# Patient Record
Sex: FEMALE | Race: WHITE | NOT HISPANIC OR LATINO | Employment: UNEMPLOYED | URBAN - METROPOLITAN AREA
[De-identification: names, ages, dates, MRNs, and addresses within clinical notes are randomized per-mention and may not be internally consistent; named-entity substitution may affect disease eponyms.]

---

## 2019-10-23 ENCOUNTER — PREP FOR SURGERY (OUTPATIENT)
Dept: OTHER | Facility: HOSPITAL | Age: 59
End: 2019-10-23

## 2019-10-23 ENCOUNTER — OFFICE VISIT (OUTPATIENT)
Dept: SURGERY | Facility: CLINIC | Age: 59
End: 2019-10-23

## 2019-10-23 VITALS
DIASTOLIC BLOOD PRESSURE: 88 MMHG | SYSTOLIC BLOOD PRESSURE: 128 MMHG | HEART RATE: 66 BPM | HEIGHT: 64 IN | BODY MASS INDEX: 33.26 KG/M2 | WEIGHT: 194.8 LBS | OXYGEN SATURATION: 98 % | TEMPERATURE: 98 F

## 2019-10-23 DIAGNOSIS — K21.9 GASTROESOPHAGEAL REFLUX DISEASE, ESOPHAGITIS PRESENCE NOT SPECIFIED: Primary | ICD-10-CM

## 2019-10-23 PROCEDURE — 99203 OFFICE O/P NEW LOW 30 MIN: CPT | Performed by: SURGERY

## 2019-10-23 RX ORDER — FLUOXETINE HYDROCHLORIDE 20 MG/1
20 CAPSULE ORAL NIGHTLY
COMMUNITY
Start: 2019-09-24

## 2019-10-23 RX ORDER — LEVOTHYROXINE SODIUM 50 UG/1
50 CAPSULE ORAL DAILY
COMMUNITY
Start: 2019-10-04

## 2019-10-23 NOTE — H&P
Subjective   Christelle Odell is a 58 y.o. female.     History of present illness  Christelle is seen in the office today at the request of her primary care physician for severe reflux disease.  She has had reflux for about 20 years.  Is been on all the medications.  She is concerned and would like to be able to stop her medicines.  Her last scope was several years ago.  In the office today we gone over the anatomy behind reflux.  We discussed hiatal hernias.  We discussed medications and infection those.  She is interested in a transoral fundoplication.  She been doing some reading about that.  I explained that we need to do an EGD to make sure she is a candidate first.  We would take a peek things and dilate her esophagus to make sure that we can pass the instrument.  We will then be back in the office to discuss her findings and decide if she is a candidate for a different whether she needs something more invasive.    Past Medical History:   Diagnosis Date   • Arthritis    • Depression    • Hypothyroidism        No past surgical history on file.    Outpatient Encounter Medications as of 10/23/2019   Medication Sig Dispense Refill   • Fexofenadine HCl (ALLEGRA ALLERGY PO) Take  by mouth.     • FLUoxetine (PROzac) 20 MG capsule Take 20 mg by mouth Daily.     • Multiple Vitamins-Minerals (MULTIVITAL PO) Take  by mouth.     • Nutritional Supplements (VITAMIN D BOOSTER PO) Take  by mouth.     • TIROSINT 50 MCG capsule Take 50 mcg by mouth Daily.       No facility-administered encounter medications on file as of 10/23/2019.        No Known Allergies    Family History   Problem Relation Age of Onset   • Hypertension Mother    • Depression Mother    • Arthritis Mother    • Allergies Mother    • Hypertension Father    • Allergies Father        Social History     Socioeconomic History   • Marital status:      Spouse name: Not on file   • Number of children: Not on file   • Years of education: Not on file   • Highest  education level: Not on file   Tobacco Use   • Smoking status: Never Smoker   • Smokeless tobacco: Never Used   Substance and Sexual Activity   • Alcohol use: Yes   • Drug use: No   • Sexual activity: Defer       The following portions of the patient's history were reviewed and updated as appropriate: allergies, current medications, past family history, past medical history, past social history, past surgical history and problem list.    Objective       Assessment/Plan   There are no diagnoses linked to this encounter.    Pleat review of systems is done and unremarkable with exception of the chief complaint.    Physical exam shows pleasant 58-year-old female.  HEENT is negative.  Heart is regular.  Lungs clear.  Abdomen soft nontender.  Extremities show equal range of motion of lower extremities previous medical strength and usage.  Neuro shows no obvious focal deficit.    Impression: 58-year-old with severe GERD    Recommendation: EGD with dilatation           Jose Antonio Ramirez DO  10/23/2019  3:14 PM

## 2019-10-23 NOTE — PROGRESS NOTES
Subjective   Christelle Odell is a 58 y.o. female.     History of present illness  Christelle is seen in the office today at the request of her primary care physician for severe reflux disease.  She has had reflux for about 20 years.  Is been on all the medications.  She is concerned and would like to be able to stop her medicines.  Her last scope was several years ago.  In the office today we gone over the anatomy behind reflux.  We discussed hiatal hernias.  We discussed medications and infection those.  She is interested in a transoral fundoplication.  She been doing some reading about that.  I explained that we need to do an EGD to make sure she is a candidate first.  We would take a peek things and dilate her esophagus to make sure that we can pass the instrument.  We will then be back in the office to discuss her findings and decide if she is a candidate for a different whether she needs something more invasive.    Past Medical History:   Diagnosis Date   • Arthritis    • Depression    • Hypothyroidism        History reviewed. No pertinent surgical history.    Outpatient Encounter Medications as of 10/23/2019   Medication Sig Dispense Refill   • Fexofenadine HCl (ALLEGRA ALLERGY PO) Take  by mouth.     • FLUoxetine (PROzac) 20 MG capsule Take 20 mg by mouth Daily.     • Multiple Vitamins-Minerals (MULTIVITAL PO) Take  by mouth.     • Nutritional Supplements (VITAMIN D BOOSTER PO) Take  by mouth.     • TIROSINT 50 MCG capsule Take 50 mcg by mouth Daily.       No facility-administered encounter medications on file as of 10/23/2019.        No Known Allergies    Family History   Problem Relation Age of Onset   • Hypertension Mother    • Depression Mother    • Arthritis Mother    • Allergies Mother    • Hypertension Father    • Allergies Father        Social History     Socioeconomic History   • Marital status:      Spouse name: Not on file   • Number of children: Not on file   • Years of education: Not on file   •  Highest education level: Not on file   Tobacco Use   • Smoking status: Never Smoker   • Smokeless tobacco: Never Used   Substance and Sexual Activity   • Alcohol use: Yes   • Drug use: No   • Sexual activity: Defer       The following portions of the patient's history were reviewed and updated as appropriate: allergies, current medications, past family history, past medical history, past social history, past surgical history and problem list.    Objective       Assessment/Plan   There are no diagnoses linked to this encounter.    Pleat review of systems is done and unremarkable with exception of the chief complaint.    Physical exam shows pleasant 58-year-old female.  HEENT is negative.  Heart is regular.  Lungs clear.  Abdomen soft nontender.  Extremities show equal range of motion of lower extremities previous medical strength and usage.  Neuro shows no obvious focal deficit.    Impression: 58-year-old with severe GERD    Recommendation: EGD with dilatation           Jose Antonio Ramirez DO  10/23/2019  3:10 PM

## 2019-11-04 ENCOUNTER — ANESTHESIA EVENT (OUTPATIENT)
Dept: GASTROENTEROLOGY | Facility: HOSPITAL | Age: 59
End: 2019-11-04

## 2019-11-05 ENCOUNTER — ANESTHESIA (OUTPATIENT)
Dept: GASTROENTEROLOGY | Facility: HOSPITAL | Age: 59
End: 2019-11-05

## 2019-11-05 ENCOUNTER — HOSPITAL ENCOUNTER (OUTPATIENT)
Facility: HOSPITAL | Age: 59
Setting detail: HOSPITAL OUTPATIENT SURGERY
Discharge: HOME | End: 2019-11-05
Attending: SURGERY | Admitting: SURGERY

## 2019-11-05 VITALS
HEIGHT: 63 IN | DIASTOLIC BLOOD PRESSURE: 85 MMHG | OXYGEN SATURATION: 98 % | SYSTOLIC BLOOD PRESSURE: 145 MMHG | WEIGHT: 193.34 LBS | BODY MASS INDEX: 34.26 KG/M2 | RESPIRATION RATE: 16 BRPM | HEART RATE: 75 BPM

## 2019-11-05 PROCEDURE — 43235 EGD DIAGNOSTIC BRUSH WASH: CPT | Performed by: SURGERY

## 2019-11-05 PROCEDURE — 25010000002 PROPOFOL 10 MG/ML EMULSION: Performed by: ANESTHESIOLOGY

## 2019-11-05 PROCEDURE — 43450 DILATE ESOPHAGUS 1/MULT PASS: CPT | Performed by: SURGERY

## 2019-11-05 RX ORDER — SODIUM CHLORIDE 9 MG/ML
9 INJECTION, SOLUTION INTRAVENOUS CONTINUOUS PRN
Status: DISCONTINUED | OUTPATIENT
Start: 2019-11-05 | End: 2019-11-05 | Stop reason: HOSPADM

## 2019-11-05 RX ORDER — PROPOFOL 10 MG/ML
VIAL (ML) INTRAVENOUS AS NEEDED
Status: DISCONTINUED | OUTPATIENT
Start: 2019-11-05 | End: 2019-11-05 | Stop reason: SURG

## 2019-11-05 RX ORDER — SODIUM CHLORIDE 0.9 % (FLUSH) 0.9 %
3-10 SYRINGE (ML) INJECTION AS NEEDED
Status: DISCONTINUED | OUTPATIENT
Start: 2019-11-05 | End: 2019-11-05 | Stop reason: HOSPADM

## 2019-11-05 RX ORDER — LIDOCAINE HYDROCHLORIDE 10 MG/ML
0.5 INJECTION, SOLUTION EPIDURAL; INFILTRATION; INTRACAUDAL; PERINEURAL ONCE AS NEEDED
Status: DISCONTINUED | OUTPATIENT
Start: 2019-11-05 | End: 2019-11-05 | Stop reason: HOSPADM

## 2019-11-05 RX ORDER — SODIUM CHLORIDE 0.9 % (FLUSH) 0.9 %
3 SYRINGE (ML) INJECTION EVERY 12 HOURS SCHEDULED
Status: DISCONTINUED | OUTPATIENT
Start: 2019-11-05 | End: 2019-11-05 | Stop reason: HOSPADM

## 2019-11-05 RX ADMIN — PROPOFOL 200 MG: 10 INJECTION, EMULSION INTRAVENOUS at 12:53

## 2019-11-05 NOTE — ANESTHESIA PREPROCEDURE EVALUATION
Anesthesia Evaluation     Patient summary reviewed and Nursing notes reviewed   history of anesthetic complications: PONV  NPO Solid Status: > 8 hours  NPO Liquid Status: > 8 hours           Airway   Mallampati: II  TM distance: >3 FB  Neck ROM: full  No difficulty expected  Dental - normal exam     Pulmonary - negative pulmonary ROS and normal exam   Cardiovascular - negative cardio ROS and normal exam        Neuro/Psych- negative ROS  GI/Hepatic/Renal/Endo    (+)  GERD,      Musculoskeletal     Abdominal  - normal exam    Bowel sounds: normal.   Substance History - negative use     OB/GYN negative ob/gyn ROS         Other   arthritis,                      Anesthesia Plan    ASA 2     MAC     intravenous induction     Anesthetic plan, all risks, benefits, and alternatives have been provided, discussed and informed consent has been obtained with: patient.

## 2019-11-05 NOTE — ANESTHESIA POSTPROCEDURE EVALUATION
Patient: Christelle Odell    Procedure Summary     Date:  11/05/19 Room / Location:  [facility] ENDOSCOPY 4 / [facility] ENDOSCOPY    Anesthesia Start:  1251 Anesthesia Stop:  1305    Procedure:  Esophagogastroduodenoscopy with dilatation (N/A ) Diagnosis:       Gastroesophageal reflux disease, esophagitis presence not specified      (Gastroesophageal reflux disease, esophagitis presence not specified [K21.9])    Surgeon:  Jose Antonio Ramirez DO Provider:  Zack Monet MD    Anesthesia Type:  MAC ASA Status:  2          Anesthesia Type: MAC  Last vitals  BP   145/78 (11/05/19 1055)   Temp       Pulse   62 (11/05/19 1055)   Resp         SpO2   95 % (11/05/19 1055)     Post Anesthesia Care and Evaluation    Patient location during evaluation: PACU  Patient participation: complete - patient participated  Level of consciousness: awake  Pain scale: See nurse's notes for pain score.  Pain management: adequate  Airway patency: patent  Anesthetic complications: No anesthetic complications  PONV Status: none  Cardiovascular status: acceptable  Respiratory status: acceptable  Hydration status: acceptable    Comments: Patient seen and examined postoperatively; vital signs stable; SpO2 greater than or equal to 90%; cardiopulmonary status stable; nausea/vomiting adequately controlled; pain adequately controlled; no apparent anesthesia complications; patient discharged from anesthesia care when discharge criteria were met

## 2019-12-12 ENCOUNTER — TELEPHONE (OUTPATIENT)
Dept: SURGERY | Facility: CLINIC | Age: 59
End: 2019-12-12

## 2019-12-12 NOTE — TELEPHONE ENCOUNTER
PATIENT LM ON MY VM 12-11-19, THE PATIENT STATES THAT SHE HAD A EGD WITH DR NORRIS IN NOV.   THE PATIENT EXPLAINS THAT SHE WAS TOLD THE SURG HAD AN AUTH.  SHE HAS NOW RCVD A BILL FOR THE FULL AMOUNT.  WHEN SHE CONTACTED [facility], THEY TOLD HER THAT WE CODED THE SURGERY INCORRECTLY AND THEY HAD REQUESTED THE CODING BE CORRECTED, BUT NEVER RCVD ANYTHNG BACK FROM US.  I SPOKE WITH THE PT TO LET HER KNOW I HAVE GOTTEN HER MSG AND I WILL CONTACT THE AUTH DEPT AND SEE WHAT WE NEED TO DO,     SENT EMAIL TO KEYANA CHO

## 2020-01-30 ENCOUNTER — PREP FOR SURGERY (OUTPATIENT)
Dept: OTHER | Facility: HOSPITAL | Age: 60
End: 2020-01-30

## 2020-01-30 DIAGNOSIS — K21.9 HIATAL HERNIA WITH GASTROESOPHAGEAL REFLUX: Primary | ICD-10-CM

## 2020-01-30 DIAGNOSIS — K44.9 HIATAL HERNIA WITH GASTROESOPHAGEAL REFLUX: Primary | ICD-10-CM

## 2020-01-30 RX ORDER — SODIUM CHLORIDE 9 MG/ML
100 INJECTION, SOLUTION INTRAVENOUS CONTINUOUS
Status: CANCELLED | OUTPATIENT
Start: 2020-01-30

## 2020-01-30 NOTE — H&P
Subjective   Christelle Odell is a 59 y.o. female.     History of present illness  Christelle is a pleasant 59-year-old female who was seen late last year for severe reflux disease.  She underwent an EGD with dilatation we found her to have a 3 cm hiatal hernia with reflux.  We were able to dilate her to 58 Ma[place] so she would be a candidate for a lap hiatal hernia repair with transoral fundoplication.  She is now wanting to proceed with surgery.  We have drawn her pictures we have discussed the procedure in detail.  All questions have been answered.    Past Medical History:   Diagnosis Date   • Arthritis     hands   • Depression    • Frequent sinus infections     occasional   • Gagging episode     gags easily   • GERD (gastroesophageal reflux disease)    • Hypothyroidism    • PONV (postoperative nausea and vomiting)    • Seasonal allergies        Past Surgical History:   Procedure Laterality Date   • ENDOSCOPY N/A 11/5/2019    Procedure: Esophagogastroduodenoscopy with dilatation ( bougie 48, 50, 52, 54, 56, 58);  Surgeon: Jose Antonio Ramirez DO;  Location: [facility] ENDOSCOPY;  Service: General   • LAPAROSCOPIC TUBAL LIGATION  1986   • ORIF ANKLE FRACTURE Right 2011   • SINUS SURGERY     • WISDOM TOOTH EXTRACTION         Outpatient Encounter Medications as of 1/30/2020   Medication Sig Dispense Refill   • esomeprazole (nexIUM) 20 MG capsule Take 20 mg by mouth Every Morning Before Breakfast. Take post procedure     • Fexofenadine HCl (ALLEGRA ALLERGY PO) Take 180 mg by mouth Daily. Take post post procedure     • FLUoxetine (PROzac) 20 MG capsule Take 20 mg by mouth Every Night.     • Multiple Vitamins-Minerals (MULTIVITAL PO) Take 1 tablet by mouth Daily. dont take preop     • Nutritional Supplements (VITAMIN D BOOSTER PO) Take 1 capsule by mouth Daily. dont take preop     • TIROSINT 50 MCG capsule Take 50 mcg by mouth Daily. Take preop       No facility-administered encounter medications on file as of 1/30/2020.        No  Known Allergies    Family History   Problem Relation Age of Onset   • Hypertension Mother    • Depression Mother    • Arthritis Mother    • Allergies Mother    • Hypertension Father    • Allergies Father        Social History     Socioeconomic History   • Marital status:      Spouse name: Not on file   • Number of children: Not on file   • Years of education: Not on file   • Highest education level: Not on file   Tobacco Use   • Smoking status: Never Smoker   • Smokeless tobacco: Never Used   Substance and Sexual Activity   • Alcohol use: Yes   • Drug use: No   • Sexual activity: Defer       The following portions of the patient's history were reviewed and updated as appropriate: allergies, current medications, past family history, past medical history, past social history, past surgical history and problem list.    Objective       Assessment/Plan   There are no diagnoses linked to this encounter.  All systems are reviewed and unremarkable with exception of the chief complaint    Physical exam shows a pleasant 59-year-old female.  HEENT is negative.  Heart regular.  Lungs clear.  Abdomen soft nontender no palpable mass.  Extremities show equal range of motion in the upper and lower extremities.  She has symmetrical strength and usage.  Neuro shows no obvious focal deficit.    Impression: 59-year-old with a hiatal hernia and severe reflux.    Plan: Lap hiatal hernia repair with transoral fundoplication             Jose Antonio Ramirez, DO  1/30/2020  1:36 PM

## 2020-01-31 PROBLEM — K44.9 HIATAL HERNIA WITH GASTROESOPHAGEAL REFLUX: Status: ACTIVE | Noted: 2020-01-31

## 2020-01-31 PROBLEM — K21.9 HIATAL HERNIA WITH GASTROESOPHAGEAL REFLUX: Status: ACTIVE | Noted: 2020-01-31

## 2020-03-03 ENCOUNTER — APPOINTMENT (OUTPATIENT)
Dept: PREADMISSION TESTING | Facility: HOSPITAL | Age: 60
End: 2020-03-03

## 2020-03-03 VITALS
HEART RATE: 71 BPM | WEIGHT: 195.2 LBS | BODY MASS INDEX: 33.32 KG/M2 | SYSTOLIC BLOOD PRESSURE: 122 MMHG | OXYGEN SATURATION: 97 % | HEIGHT: 64 IN | DIASTOLIC BLOOD PRESSURE: 81 MMHG

## 2020-03-03 DIAGNOSIS — K21.9 HIATAL HERNIA WITH GASTROESOPHAGEAL REFLUX: ICD-10-CM

## 2020-03-03 DIAGNOSIS — K44.9 HIATAL HERNIA WITH GASTROESOPHAGEAL REFLUX: ICD-10-CM

## 2020-03-03 LAB
ABO GROUP BLD: NORMAL
ALBUMIN SERPL-MCNC: 4.5 G/DL (ref 3.5–5.2)
ALBUMIN/GLOB SERPL: 1.7 G/DL
ALP SERPL-CCNC: 95 U/L (ref 39–117)
ALT SERPL W P-5'-P-CCNC: 13 U/L (ref 1–33)
ANION GAP SERPL CALCULATED.3IONS-SCNC: 10 MMOL/L (ref 5–15)
APTT PPP: 22.7 SECONDS (ref 24–31)
AST SERPL-CCNC: 18 U/L (ref 1–32)
BASOPHILS # BLD AUTO: 0 10*3/MM3 (ref 0–0.2)
BASOPHILS NFR BLD AUTO: 1 % (ref 0–1.5)
BILIRUB SERPL-MCNC: 0.2 MG/DL (ref 0.2–1.2)
BLD GP AB SCN SERPL QL: NEGATIVE
BUN BLD-MCNC: 16 MG/DL (ref 6–20)
BUN/CREAT SERPL: 18 (ref 7–25)
CALCIUM SPEC-SCNC: 9.2 MG/DL (ref 8.6–10.5)
CHLORIDE SERPL-SCNC: 102 MMOL/L (ref 98–107)
CO2 SERPL-SCNC: 28 MMOL/L (ref 22–29)
CREAT BLD-MCNC: 0.89 MG/DL (ref 0.57–1)
DEPRECATED RDW RBC AUTO: 43.8 FL (ref 37–54)
EOSINOPHIL # BLD AUTO: 0.4 10*3/MM3 (ref 0–0.4)
EOSINOPHIL NFR BLD AUTO: 6.9 % (ref 0.3–6.2)
ERYTHROCYTE [DISTWIDTH] IN BLOOD BY AUTOMATED COUNT: 13.1 % (ref 12.3–15.4)
GFR SERPL CREATININE-BSD FRML MDRD: 65 ML/MIN/1.73
GLOBULIN UR ELPH-MCNC: 2.7 GM/DL
GLUCOSE BLD-MCNC: 90 MG/DL (ref 65–99)
HCT VFR BLD AUTO: 40.1 % (ref 34–46.6)
HGB BLD-MCNC: 13.7 G/DL (ref 12–15.9)
INR PPP: 0.98 (ref 0.9–1.1)
LYMPHOCYTES # BLD AUTO: 1.5 10*3/MM3 (ref 0.7–3.1)
LYMPHOCYTES NFR BLD AUTO: 29.4 % (ref 19.6–45.3)
MCH RBC QN AUTO: 32.4 PG (ref 26.6–33)
MCHC RBC AUTO-ENTMCNC: 34.2 G/DL (ref 31.5–35.7)
MCV RBC AUTO: 94.7 FL (ref 79–97)
MONOCYTES # BLD AUTO: 0.4 10*3/MM3 (ref 0.1–0.9)
MONOCYTES NFR BLD AUTO: 7.2 % (ref 5–12)
NEUTROPHILS # BLD AUTO: 2.9 10*3/MM3 (ref 1.7–7)
NEUTROPHILS NFR BLD AUTO: 55.5 % (ref 42.7–76)
NRBC BLD AUTO-RTO: 0 /100 WBC (ref 0–0.2)
PLATELET # BLD AUTO: 406 10*3/MM3 (ref 140–450)
PMV BLD AUTO: 6.8 FL (ref 6–12)
POTASSIUM BLD-SCNC: 4.4 MMOL/L (ref 3.5–5.2)
PROT SERPL-MCNC: 7.2 G/DL (ref 6–8.5)
PROTHROMBIN TIME: 10.3 SECONDS (ref 9.6–11.7)
RBC # BLD AUTO: 4.24 10*6/MM3 (ref 3.77–5.28)
RH BLD: POSITIVE
SODIUM BLD-SCNC: 140 MMOL/L (ref 136–145)
T&S EXPIRATION DATE: NORMAL
WBC NRBC COR # BLD: 5.2 10*3/MM3 (ref 3.4–10.8)

## 2020-03-03 PROCEDURE — 36415 COLL VENOUS BLD VENIPUNCTURE: CPT

## 2020-03-03 PROCEDURE — 85730 THROMBOPLASTIN TIME PARTIAL: CPT | Performed by: SURGERY

## 2020-03-03 PROCEDURE — 93005 ELECTROCARDIOGRAM TRACING: CPT

## 2020-03-03 PROCEDURE — 85610 PROTHROMBIN TIME: CPT | Performed by: SURGERY

## 2020-03-03 PROCEDURE — 86850 RBC ANTIBODY SCREEN: CPT | Performed by: SURGERY

## 2020-03-03 PROCEDURE — 86901 BLOOD TYPING SEROLOGIC RH(D): CPT | Performed by: SURGERY

## 2020-03-03 PROCEDURE — 80053 COMPREHEN METABOLIC PANEL: CPT | Performed by: SURGERY

## 2020-03-03 PROCEDURE — 86900 BLOOD TYPING SEROLOGIC ABO: CPT

## 2020-03-03 PROCEDURE — 86901 BLOOD TYPING SEROLOGIC RH(D): CPT

## 2020-03-03 PROCEDURE — 86900 BLOOD TYPING SEROLOGIC ABO: CPT | Performed by: SURGERY

## 2020-03-03 PROCEDURE — 85025 COMPLETE CBC W/AUTO DIFF WBC: CPT | Performed by: SURGERY

## 2020-03-03 RX ORDER — DOCUSATE SODIUM 100 MG/1
100 CAPSULE, LIQUID FILLED ORAL DAILY
COMMUNITY

## 2020-03-06 ENCOUNTER — ANESTHESIA EVENT (OUTPATIENT)
Dept: PERIOP | Facility: HOSPITAL | Age: 60
End: 2020-03-06

## 2020-03-06 PROCEDURE — 93010 ELECTROCARDIOGRAM REPORT: CPT | Performed by: INTERNAL MEDICINE

## 2020-03-09 ENCOUNTER — ANESTHESIA (OUTPATIENT)
Dept: PERIOP | Facility: HOSPITAL | Age: 60
End: 2020-03-09

## 2020-03-09 ENCOUNTER — HOSPITAL ENCOUNTER (OUTPATIENT)
Facility: HOSPITAL | Age: 60
Discharge: HOME | End: 2020-03-10
Attending: SURGERY | Admitting: SURGERY

## 2020-03-09 DIAGNOSIS — K21.9 HIATAL HERNIA WITH GASTROESOPHAGEAL REFLUX: ICD-10-CM

## 2020-03-09 DIAGNOSIS — K44.9 HIATAL HERNIA WITH GASTROESOPHAGEAL REFLUX: ICD-10-CM

## 2020-03-09 PROBLEM — F32.A DEPRESSION: Chronic | Status: ACTIVE | Noted: 2020-03-09

## 2020-03-09 PROBLEM — E03.9 HYPOTHYROIDISM: Chronic | Status: ACTIVE | Noted: 2020-03-09

## 2020-03-09 PROCEDURE — 43280 LAPAROSCOPY FUNDOPLASTY: CPT | Performed by: SURGERY

## 2020-03-09 PROCEDURE — C1889 IMPLANT/INSERT DEVICE, NOC: HCPCS | Performed by: SURGERY

## 2020-03-09 PROCEDURE — 25010000002 DEXAMETHASONE PER 1 MG: Performed by: ANESTHESIOLOGIST ASSISTANT

## 2020-03-09 PROCEDURE — 25010000002 ONDANSETRON PER 1 MG: Performed by: ANESTHESIOLOGIST ASSISTANT

## 2020-03-09 PROCEDURE — 99203 OFFICE O/P NEW LOW 30 MIN: CPT | Performed by: NURSE PRACTITIONER

## 2020-03-09 PROCEDURE — G0378 HOSPITAL OBSERVATION PER HR: HCPCS

## 2020-03-09 PROCEDURE — 25010000002 FENTANYL CITRATE (PF) 100 MCG/2ML SOLUTION: Performed by: ANESTHESIOLOGIST ASSISTANT

## 2020-03-09 PROCEDURE — 25010000002 ONDANSETRON PER 1 MG: Performed by: SURGERY

## 2020-03-09 PROCEDURE — 25010000002 CEFAZOLIN PER 500 MG: Performed by: SURGERY

## 2020-03-09 PROCEDURE — 25010000002 HYDROMORPHONE PER 4 MG: Performed by: ANESTHESIOLOGIST ASSISTANT

## 2020-03-09 PROCEDURE — 25010000002 KETOROLAC TROMETHAMINE PER 15 MG: Performed by: ANESTHESIOLOGIST ASSISTANT

## 2020-03-09 PROCEDURE — 25010000002 MIDAZOLAM PER 1 MG: Performed by: ANESTHESIOLOGIST ASSISTANT

## 2020-03-09 PROCEDURE — 25010000002 PROPOFOL 10 MG/ML EMULSION: Performed by: ANESTHESIOLOGIST ASSISTANT

## 2020-03-09 DEVICE — CARTRIDGE, 20 FASTENERS, 0.010" SLOT, 7.5MM WEB
Type: IMPLANTABLE DEVICE | Site: ESOPHAGUS | Status: FUNCTIONAL
Brand: 7.5MM CARTRIDGE

## 2020-03-09 RX ORDER — ROCURONIUM BROMIDE 10 MG/ML
INJECTION, SOLUTION INTRAVENOUS AS NEEDED
Status: DISCONTINUED | OUTPATIENT
Start: 2020-03-09 | End: 2020-03-09 | Stop reason: SURG

## 2020-03-09 RX ORDER — SODIUM CHLORIDE 0.9 % (FLUSH) 0.9 %
10 SYRINGE (ML) INJECTION AS NEEDED
Status: DISCONTINUED | OUTPATIENT
Start: 2020-03-09 | End: 2020-03-09 | Stop reason: HOSPADM

## 2020-03-09 RX ORDER — LORAZEPAM 2 MG/ML
1 INJECTION INTRAMUSCULAR
Status: DISCONTINUED | OUTPATIENT
Start: 2020-03-09 | End: 2020-03-09 | Stop reason: HOSPADM

## 2020-03-09 RX ORDER — SODIUM CHLORIDE, SODIUM LACTATE, POTASSIUM CHLORIDE, CALCIUM CHLORIDE 600; 310; 30; 20 MG/100ML; MG/100ML; MG/100ML; MG/100ML
9 INJECTION, SOLUTION INTRAVENOUS CONTINUOUS PRN
Status: DISCONTINUED | OUTPATIENT
Start: 2020-03-09 | End: 2020-03-09 | Stop reason: HOSPADM

## 2020-03-09 RX ORDER — HYDROMORPHONE HCL 110MG/55ML
0.5 PATIENT CONTROLLED ANALGESIA SYRINGE INTRAVENOUS
Status: DISCONTINUED | OUTPATIENT
Start: 2020-03-09 | End: 2020-03-10 | Stop reason: HOSPADM

## 2020-03-09 RX ORDER — HYDROCODONE BITARTRATE AND ACETAMINOPHEN 5; 325 MG/1; MG/1
1 TABLET ORAL EVERY 4 HOURS PRN
Status: DISCONTINUED | OUTPATIENT
Start: 2020-03-09 | End: 2020-03-10 | Stop reason: HOSPADM

## 2020-03-09 RX ORDER — DEXAMETHASONE SODIUM PHOSPHATE 4 MG/ML
INJECTION, SOLUTION INTRA-ARTICULAR; INTRALESIONAL; INTRAMUSCULAR; INTRAVENOUS; SOFT TISSUE AS NEEDED
Status: DISCONTINUED | OUTPATIENT
Start: 2020-03-09 | End: 2020-03-09 | Stop reason: SURG

## 2020-03-09 RX ORDER — MORPHINE SULFATE 4 MG/ML
4 INJECTION, SOLUTION INTRAMUSCULAR; INTRAVENOUS
Status: DISCONTINUED | OUTPATIENT
Start: 2020-03-09 | End: 2020-03-10 | Stop reason: HOSPADM

## 2020-03-09 RX ORDER — PROMETHAZINE HYDROCHLORIDE 25 MG/1
25 TABLET ORAL ONCE AS NEEDED
Status: DISCONTINUED | OUTPATIENT
Start: 2020-03-09 | End: 2020-03-09 | Stop reason: HOSPADM

## 2020-03-09 RX ORDER — ONDANSETRON 4 MG/1
4 TABLET, FILM COATED ORAL EVERY 6 HOURS PRN
Status: DISCONTINUED | OUTPATIENT
Start: 2020-03-09 | End: 2020-03-10 | Stop reason: HOSPADM

## 2020-03-09 RX ORDER — HYDROMORPHONE HCL 110MG/55ML
PATIENT CONTROLLED ANALGESIA SYRINGE INTRAVENOUS AS NEEDED
Status: DISCONTINUED | OUTPATIENT
Start: 2020-03-09 | End: 2020-03-09 | Stop reason: SURG

## 2020-03-09 RX ORDER — KETOROLAC TROMETHAMINE 30 MG/ML
INJECTION, SOLUTION INTRAMUSCULAR; INTRAVENOUS AS NEEDED
Status: DISCONTINUED | OUTPATIENT
Start: 2020-03-09 | End: 2020-03-09 | Stop reason: SURG

## 2020-03-09 RX ORDER — PANTOPRAZOLE SODIUM 40 MG/1
40 TABLET, DELAYED RELEASE ORAL
Status: DISCONTINUED | OUTPATIENT
Start: 2020-03-10 | End: 2020-03-10 | Stop reason: HOSPADM

## 2020-03-09 RX ORDER — FENTANYL CITRATE 50 UG/ML
INJECTION, SOLUTION INTRAMUSCULAR; INTRAVENOUS AS NEEDED
Status: DISCONTINUED | OUTPATIENT
Start: 2020-03-09 | End: 2020-03-09 | Stop reason: SURG

## 2020-03-09 RX ORDER — NALOXONE HCL 0.4 MG/ML
0.1 VIAL (ML) INJECTION
Status: DISCONTINUED | OUTPATIENT
Start: 2020-03-09 | End: 2020-03-10 | Stop reason: HOSPADM

## 2020-03-09 RX ORDER — NALOXONE HCL 0.4 MG/ML
0.4 VIAL (ML) INJECTION
Status: DISCONTINUED | OUTPATIENT
Start: 2020-03-09 | End: 2020-03-10 | Stop reason: HOSPADM

## 2020-03-09 RX ORDER — HYDROMORPHONE HCL 110MG/55ML
0.5 PATIENT CONTROLLED ANALGESIA SYRINGE INTRAVENOUS
Status: DISCONTINUED | OUTPATIENT
Start: 2020-03-09 | End: 2020-03-09 | Stop reason: HOSPADM

## 2020-03-09 RX ORDER — BUPIVACAINE HYDROCHLORIDE 2.5 MG/ML
INJECTION, SOLUTION INFILTRATION; PERINEURAL AS NEEDED
Status: DISCONTINUED | OUTPATIENT
Start: 2020-03-09 | End: 2020-03-09 | Stop reason: HOSPADM

## 2020-03-09 RX ORDER — ONDANSETRON 2 MG/ML
4 INJECTION INTRAMUSCULAR; INTRAVENOUS EVERY 6 HOURS PRN
Status: DISCONTINUED | OUTPATIENT
Start: 2020-03-09 | End: 2020-03-10 | Stop reason: HOSPADM

## 2020-03-09 RX ORDER — FENTANYL CITRATE 50 UG/ML
50 INJECTION, SOLUTION INTRAMUSCULAR; INTRAVENOUS
Status: DISCONTINUED | OUTPATIENT
Start: 2020-03-09 | End: 2020-03-09 | Stop reason: HOSPADM

## 2020-03-09 RX ORDER — ACETAMINOPHEN 650 MG/1
650 SUPPOSITORY RECTAL EVERY 4 HOURS PRN
Status: DISCONTINUED | OUTPATIENT
Start: 2020-03-09 | End: 2020-03-10 | Stop reason: HOSPADM

## 2020-03-09 RX ORDER — PROMETHAZINE HYDROCHLORIDE 25 MG/ML
6.25 INJECTION, SOLUTION INTRAMUSCULAR; INTRAVENOUS ONCE AS NEEDED
Status: DISCONTINUED | OUTPATIENT
Start: 2020-03-09 | End: 2020-03-09 | Stop reason: HOSPADM

## 2020-03-09 RX ORDER — NEOSTIGMINE METHYLSULFATE 5 MG/5 ML
SYRINGE (ML) INTRAVENOUS AS NEEDED
Status: DISCONTINUED | OUTPATIENT
Start: 2020-03-09 | End: 2020-03-09 | Stop reason: SURG

## 2020-03-09 RX ORDER — ACETAMINOPHEN 325 MG/1
650 TABLET ORAL EVERY 4 HOURS PRN
Status: DISCONTINUED | OUTPATIENT
Start: 2020-03-09 | End: 2020-03-10 | Stop reason: HOSPADM

## 2020-03-09 RX ORDER — EPHEDRINE SULFATE/0.9% NACL/PF 25 MG/5 ML
SYRINGE (ML) INTRAVENOUS AS NEEDED
Status: DISCONTINUED | OUTPATIENT
Start: 2020-03-09 | End: 2020-03-09 | Stop reason: SURG

## 2020-03-09 RX ORDER — GLYCOPYRROLATE 1 MG/5 ML
SYRINGE (ML) INTRAVENOUS AS NEEDED
Status: DISCONTINUED | OUTPATIENT
Start: 2020-03-09 | End: 2020-03-09 | Stop reason: SURG

## 2020-03-09 RX ORDER — ONDANSETRON 2 MG/ML
4 INJECTION INTRAMUSCULAR; INTRAVENOUS ONCE AS NEEDED
Status: DISCONTINUED | OUTPATIENT
Start: 2020-03-09 | End: 2020-03-09 | Stop reason: HOSPADM

## 2020-03-09 RX ORDER — SODIUM CHLORIDE 9 MG/ML
100 INJECTION, SOLUTION INTRAVENOUS CONTINUOUS
Status: DISCONTINUED | OUTPATIENT
Start: 2020-03-09 | End: 2020-03-09 | Stop reason: SDUPTHER

## 2020-03-09 RX ORDER — SODIUM CHLORIDE 9 MG/ML
100 INJECTION, SOLUTION INTRAVENOUS CONTINUOUS
Status: DISCONTINUED | OUTPATIENT
Start: 2020-03-09 | End: 2020-03-10 | Stop reason: HOSPADM

## 2020-03-09 RX ORDER — MIDAZOLAM HYDROCHLORIDE 1 MG/ML
INJECTION INTRAMUSCULAR; INTRAVENOUS AS NEEDED
Status: DISCONTINUED | OUTPATIENT
Start: 2020-03-09 | End: 2020-03-09 | Stop reason: SURG

## 2020-03-09 RX ORDER — SCOLOPAMINE TRANSDERMAL SYSTEM 1 MG/1
1 PATCH, EXTENDED RELEASE TRANSDERMAL
Status: DISCONTINUED | OUTPATIENT
Start: 2020-03-09 | End: 2020-03-09 | Stop reason: HOSPADM

## 2020-03-09 RX ORDER — ONDANSETRON 2 MG/ML
INJECTION INTRAMUSCULAR; INTRAVENOUS AS NEEDED
Status: DISCONTINUED | OUTPATIENT
Start: 2020-03-09 | End: 2020-03-09 | Stop reason: SURG

## 2020-03-09 RX ORDER — OXYCODONE HYDROCHLORIDE 5 MG/1
10 TABLET ORAL EVERY 4 HOURS PRN
Status: DISCONTINUED | OUTPATIENT
Start: 2020-03-09 | End: 2020-03-10 | Stop reason: HOSPADM

## 2020-03-09 RX ORDER — PROMETHAZINE HYDROCHLORIDE 25 MG/ML
12.5 INJECTION, SOLUTION INTRAMUSCULAR; INTRAVENOUS EVERY 6 HOURS PRN
Status: DISCONTINUED | OUTPATIENT
Start: 2020-03-09 | End: 2020-03-10 | Stop reason: HOSPADM

## 2020-03-09 RX ORDER — PROMETHAZINE HYDROCHLORIDE 25 MG/1
25 SUPPOSITORY RECTAL ONCE AS NEEDED
Status: DISCONTINUED | OUTPATIENT
Start: 2020-03-09 | End: 2020-03-09 | Stop reason: HOSPADM

## 2020-03-09 RX ORDER — LIDOCAINE HYDROCHLORIDE 10 MG/ML
INJECTION, SOLUTION EPIDURAL; INFILTRATION; INTRACAUDAL; PERINEURAL AS NEEDED
Status: DISCONTINUED | OUTPATIENT
Start: 2020-03-09 | End: 2020-03-09 | Stop reason: SURG

## 2020-03-09 RX ORDER — PROPOFOL 10 MG/ML
VIAL (ML) INTRAVENOUS AS NEEDED
Status: DISCONTINUED | OUTPATIENT
Start: 2020-03-09 | End: 2020-03-09 | Stop reason: SURG

## 2020-03-09 RX ORDER — SODIUM CHLORIDE 0.9 % (FLUSH) 0.9 %
10 SYRINGE (ML) INJECTION EVERY 12 HOURS SCHEDULED
Status: DISCONTINUED | OUTPATIENT
Start: 2020-03-09 | End: 2020-03-09 | Stop reason: HOSPADM

## 2020-03-09 RX ADMIN — PROPOFOL 170 MG: 10 INJECTION, EMULSION INTRAVENOUS at 07:39

## 2020-03-09 RX ADMIN — SCOPALAMINE 1 PATCH: 1 PATCH, EXTENDED RELEASE TRANSDERMAL at 06:58

## 2020-03-09 RX ADMIN — Medication 0.2 MG: at 08:27

## 2020-03-09 RX ADMIN — CEFAZOLIN SODIUM 2 G: 1 INJECTION, POWDER, FOR SOLUTION INTRAMUSCULAR; INTRAVENOUS at 07:44

## 2020-03-09 RX ADMIN — Medication 0.4 MG: at 09:51

## 2020-03-09 RX ADMIN — SODIUM CHLORIDE 100 ML/HR: 0.9 INJECTION, SOLUTION INTRAVENOUS at 23:00

## 2020-03-09 RX ADMIN — SODIUM CHLORIDE, SODIUM LACTATE, POTASSIUM CHLORIDE, AND CALCIUM CHLORIDE 9 ML/HR: 600; 310; 30; 20 INJECTION, SOLUTION INTRAVENOUS at 06:28

## 2020-03-09 RX ADMIN — DEXAMETHASONE SODIUM PHOSPHATE 10 MG: 4 INJECTION, SOLUTION INTRAMUSCULAR; INTRAVENOUS at 07:44

## 2020-03-09 RX ADMIN — MIDAZOLAM 2 MG: 1 INJECTION INTRAMUSCULAR; INTRAVENOUS at 07:37

## 2020-03-09 RX ADMIN — FENTANYL CITRATE 50 MCG: 50 INJECTION, SOLUTION INTRAMUSCULAR; INTRAVENOUS at 07:39

## 2020-03-09 RX ADMIN — HYDROMORPHONE HYDROCHLORIDE 0.5 MG: 2 INJECTION INTRAMUSCULAR; INTRAVENOUS; SUBCUTANEOUS at 08:44

## 2020-03-09 RX ADMIN — CEFAZOLIN SODIUM 2 G: 10 INJECTION, POWDER, FOR SOLUTION INTRAVENOUS at 23:58

## 2020-03-09 RX ADMIN — HYDROMORPHONE HYDROCHLORIDE 0.5 MG: 2 INJECTION INTRAMUSCULAR; INTRAVENOUS; SUBCUTANEOUS at 08:54

## 2020-03-09 RX ADMIN — LIDOCAINE HYDROCHLORIDE: 20 SOLUTION ORAL; TOPICAL at 11:06

## 2020-03-09 RX ADMIN — CEFAZOLIN SODIUM 2 G: 10 INJECTION, POWDER, FOR SOLUTION INTRAVENOUS at 17:01

## 2020-03-09 RX ADMIN — SODIUM CHLORIDE 100 ML/HR: 0.9 INJECTION, SOLUTION INTRAVENOUS at 11:57

## 2020-03-09 RX ADMIN — Medication 10 MG: at 08:29

## 2020-03-09 RX ADMIN — ROCURONIUM BROMIDE 50 MG: 10 INJECTION, SOLUTION INTRAVENOUS at 07:39

## 2020-03-09 RX ADMIN — Medication 3 MG: at 09:51

## 2020-03-09 RX ADMIN — LIDOCAINE HYDROCHLORIDE 50 MG: 10 INJECTION, SOLUTION EPIDURAL; INFILTRATION; INTRACAUDAL; PERINEURAL at 07:39

## 2020-03-09 RX ADMIN — HYDROCODONE BITARTRATE AND ACETAMINOPHEN 1 TABLET: 5; 325 TABLET ORAL at 19:23

## 2020-03-09 RX ADMIN — KETOROLAC TROMETHAMINE 30 MG: 30 INJECTION, SOLUTION INTRAMUSCULAR at 10:00

## 2020-03-09 RX ADMIN — ONDANSETRON 4 MG: 2 INJECTION INTRAMUSCULAR; INTRAVENOUS at 09:46

## 2020-03-09 RX ADMIN — FENTANYL CITRATE 50 MCG: 50 INJECTION, SOLUTION INTRAMUSCULAR; INTRAVENOUS at 08:15

## 2020-03-09 RX ADMIN — ONDANSETRON 4 MG: 2 INJECTION INTRAMUSCULAR; INTRAVENOUS at 20:50

## 2020-03-09 NOTE — ANESTHESIA PREPROCEDURE EVALUATION
Anesthesia Evaluation     Patient summary reviewed and Nursing notes reviewed   history of anesthetic complications: PONV  NPO Solid Status: > 8 hours  NPO Liquid Status: > 8 hours           Airway   Mallampati: I  TM distance: >3 FB  Neck ROM: full  No difficulty expected  Dental - normal exam     Pulmonary - normal exam   Cardiovascular - normal exam        Neuro/Psych  GI/Hepatic/Renal/Endo    (+)  GERD,      Musculoskeletal     Abdominal  - normal exam    Bowel sounds: normal.   Substance History      OB/GYN          Other   arthritis,                      Anesthesia Plan    ASA 2     general     intravenous induction     Anesthetic plan, all risks, benefits, and alternatives have been provided, discussed and informed consent has been obtained with: patient.    Plan discussed with CAA.

## 2020-03-09 NOTE — OP NOTE
HIATAL HERNIA REPAIR LAPAROSCOPIC WITH TRANSORAL INCISIONLESS FUNDOPLICATION  Procedure Report    Patient Name:  Christelle Odell  YOB: 1960    Date of Surgery:  3/9/2020     Indications: Hiatal hernia with reflux    Pre-op Diagnosis:   Hiatal hernia with gastroesophageal reflux [K21.9, K44.9]       Post-Op Diagnosis Codes:     * Hiatal hernia with gastroesophageal reflux [K21.9, K44.9], 4 to 5 cm hiatal hernia with reflux, type I sliding  Procedure/CPT® Codes:      Procedure(s):  HIATAL HERNIA REPAIR LAPAROSCOPIC WITH TRANSORAL INCISIONLESS FUNDOPLICATION    Staff:  Surgeon(s):  Jose Antonio Ramirez DO    Assistant: Es Brown RNFA    Anesthesia: General    Anesthetist: PAM Hart    Estimated Blood Loss: minimal    Implants:    Implant Name Type Inv. Item Serial No.  Lot No. LRB No. Used   CARTRDG FASTENER GI SEROSAFUSE 7.5MM EA/20 - [id number] Implant CARTRDG FASTENER GI SEROSAFUSE 7.5MM EA/20  ENDOGASTRIC Yoyo [facility] [id number] N/A 1       Specimen:          None        Findings: 4 to 5 cm hiatal hernia with reflux    Complications: None    Description of Procedure: Christelle is a 59-year-old seen in the office with a long history of severe reflux disease.  We thought she had a 3 cm hernia however at the time of her surgery actually found a 4 to 5 cm hernia.  We discussed the procedure with risks in detail in the office.  All questions were answered.  No new questions today.    Patient was taken operating room placed in supine position.  General was done by PAM Hart and covering anesthesiologist.  Timeout done and identity verified she is placed on the pink anti-slide pad in banana boot lithotomy position with thigh straps securing her to the table and a chest strap.  Abdomen then prepped and draped after 3-minute dry time.  A left paraumbilical incision was made and varies needle was passed through all layers.  Saline drop test was done was negative and the abdomen  insufflated with CO2 to approximately 15 mmHg pressure.  Disposable 11 mm trocar and sheath was passed and the laparoscope introduced confirming intra-abdominal positioning with no injury.  The other 4 ports were then placed and the [employer] liver retractor is positioned  She is placed in steep reverse Trendelenburg position and rolled to the right.  Stomach is then grasped placing it on downward traction  And the the gastro hepatic ligament is opened with the [place] LigaSure device up to near the diaphragm.  The peritoneum overlying the right nadir was then scored and the mediastinal plane is entered and the esophagus is freed from its filmy attachments circumferentially up into the chest gaining length.  We then dissected the left nadir and the crura are then approximated using the Endo Stitch device.  5 stitches are required to close down the defect.  We then used [employer] suture passer and 0 Vicryl stitches were placed through the fascia at both 11 mm port sites.  All ports are then removed allowing CO2 to escape to the atmosphere proving no bleeding from the port sites.  The fascial stitches are tied down then skin closed with 4-0 Vicryl in a subcu manner.  Sterile OpSite dressings applied over Mastisol and Steri-Strips.  She is then taken out of banana boot lithotomy position turned to the left lateral decub position on the blue positioning pillow.  She secured to the table with wide tape and leg straps.  The Olympus upper pan video scope was then passed down the cricopharyngeus into the esophagus stomach and duodenum.  Duodenum was normal scope was withdrawn back into the stomach and retroflexed upon itself the hiatal hernia was now nicely closed down.  We pulled the scope back to the Z line and it was noted at 36 cm from the incisors.  Is very short person with a short esophagus.  We then sequentially dilated her esophagus from 48-58 Ch[place] using Villavicencio type dilators.  The esophagus to device was then  opened and prepared by lubricating it at its pivot points and down the channel scope was then lubricated and passed down its channel then under direct vision the scope and the device were passed down the esophagus and on into the stomach.  Scope was retroflexed upon itself viewing the tissue mold coming through the diaphragmatic opening.  We then began by loading the instrument and 2 firings were done at 6:00 to between 7 and 8 to between 8 and 9 to between 9 and 10 and 2 between 10 and 11.  We then did 2 firings at 5:00 to between 3 and 4 to between 2 and 3 to between 1 and 2.  The remainder the firings were then done between 5 and 7:00 gaining additional length of the valve.  We had a very nice omega shaped beefy valve.  No further firings were needed.  The scope and the device were then removed under direct vision and we then went back down with the scope to visualize the valve check for any evidence of injury.  There was no injury to the stomach or the esophagus.  The stomach was decompressed of as much fluid and air as possible on the way out.  She tolerated the procedure well was awakened and transferred to recovery in satisfactory condition.  The final sponge instrument and needle counts were correct.  Jose Antonio Ramirez,      Date: 3/9/2020  Time: 9:56 AM

## 2020-03-09 NOTE — CONSULTS
[facility] Medicine Services      Patient Name: Christelle Odell  : 1960  MRN: [id number]  Primary Care Physician: Karine Otero PA-C  Date of admission: 3/9/2020    Patient Care Team:  Karine Otero PA-C as PCP - General (Physician Assistant)          Subjective   History Present Illness     Chief Complaint: Direct admit for hiatal hernia repair      Ms. Odell is a 59 y.o. female with PMH of severe GERD who underwent hiatal hernia repair with transoral incisionless fundoplication by Dr. Ramirez 3/9/2020. The hospitalist group was consulted for medical management. She has PMH of hypothyroidism, depression, headaches, and hypoglycemia. She denied any complaints other than some pain near abdominal incisions.     Review of Systems   Constitution: Negative.   HENT: Negative.    Eyes: Negative.    Cardiovascular: Negative.    Respiratory: Negative.    Endocrine: Negative.    Hematologic/Lymphatic: Negative.    Skin: Negative.    Musculoskeletal: Negative.    Gastrointestinal: Positive for abdominal pain.   Genitourinary: Negative.    Neurological: Negative.    Psychiatric/Behavioral: Negative.    Allergic/Immunologic: Negative.    All other systems reviewed and are negative.          Personal History     Past Medical History:   Past Medical History:   Diagnosis Date   • Allergy     dog dander   • Arthritis     hands   • Depression    • Frequent headaches     sinus   • Frequent sinus infections     occasional   • Gagging episode     gags easily   • GERD (gastroesophageal reflux disease)    • Hypoglycemia    • Hypothyroidism    • PONV (postoperative nausea and vomiting)    • Seasonal allergies    • Vitamin D deficiency        Surgical History:      Past Surgical History:   Procedure Laterality Date   • COLONOSCOPY     • ENDOSCOPY N/A 2019    Procedure: Esophagogastroduodenoscopy with dilatation ( bougie 48, 50, 52, 54, 56, 58);  Surgeon: Jose Antonio Ramirez DO;  Location: [facility]  ENDOSCOPY;  Service: General   • HARDWARE REMOVAL Right     ankle   • HERNIA REPAIR     • LAPAROSCOPIC TUBAL LIGATION  1986   • ORIF ANKLE FRACTURE Right 2011   • SINUS SURGERY  2001   • WISDOM TOOTH EXTRACTION         Family History: family history includes Allergies in her father and mother; Arthritis in her mother; Depression in her mother; Hypertension in her father and mother.     Social History:  reports that she has never smoked. She has never used smokeless tobacco. She reports that she drinks about 2.0 standard drinks of alcohol per week. She reports that she does not use drugs.      Medications:  Prior to Admission medications    Medication Sig Start Date End Date Taking? Authorizing Provider   docusate sodium (COLACE) 100 MG capsule Take 100 mg by mouth Daily. afternoon   Yes Kali Quach MD   esomeprazole (nexIUM) 20 MG capsule Take 20 mg by mouth Daily. afternoon   Yes Kali Quach MD   Fexofenadine HCl (ALLEGRA ALLERGY PO) Take 180 mg by mouth Daily. afternoon   Yes Kali Quach MD   FLUoxetine (PROzac) 20 MG capsule Take 20 mg by mouth Every Night. 9/24/19  Yes Kali Quach MD   TIROSINT 50 MCG capsule Take 50 mcg by mouth Daily. Take preop 10/4/19  Yes Kali Quach MD   Cholecalciferol 50 MCG (2000 UT) tablet Take 2,000 Units by mouth 3 (Three) Times a Week.    Kali Quach MD   Multiple Vitamins-Minerals (MULTIVITAL PO) Take 1 tablet by mouth Daily. dont take preop    Kali Quach MD   Nutritional Supplements (VITAMIN D BOOSTER PO) Take 1 capsule by mouth Daily. dont take preop    Kali Quach MD       Allergies:  No Known Allergies    Objective   Objective     Vital Signs  Temp:  [97.6 °F (36.4 °C)-98 °F (36.7 °C)] 97.6 °F (36.4 °C)  Heart Rate:  [56-76] 56  Resp:  [11-19] 15  BP: (121-137)/(76-82) 125/79  SpO2:  [93 %-98 %] 96 %  on  Flow (L/min):  [2-4] 2;   Device (Oxygen Therapy): nasal cannula  Body mass index is 33.94  kg/m².    Physical Exam   Constitutional: She is oriented to person, place, and time. She appears well-developed and well-nourished. No distress.   HENT:   Head: Normocephalic and atraumatic.   Nose: Nose normal.   Eyes: Pupils are equal, round, and reactive to light. Conjunctivae and EOM are normal.   Neck: Normal range of motion.   Cardiovascular: Normal rate, regular rhythm, normal heart sounds and intact distal pulses.   Pulmonary/Chest: Effort normal and breath sounds normal. No stridor. No respiratory distress.   Abdominal: Soft. Bowel sounds are normal. She exhibits no distension. There is no tenderness.   Abdomen with surgical dressings in place with scant sero-sanguinous drainage   Musculoskeletal: Normal range of motion. She exhibits no edema, tenderness or deformity.   Neurological: She is alert and oriented to person, place, and time. No cranial nerve deficit.   Skin: Skin is warm and dry. No rash noted. She is not diaphoretic. No erythema.   Psychiatric: She has a normal mood and affect. Her behavior is normal.   Nursing note and vitals reviewed.      Results Review:  I have personally reviewed most recent cardiac tracings, lab results and radiology images and interpretations and agree with findings.    Results from last 7 days   Lab Units 03/03/20  1324   WBC 10*3/mm3 5.20   HEMOGLOBIN g/dL 13.7   HEMATOCRIT % 40.1   PLATELETS 10*3/mm3 406   INR  0.98     Results from last 7 days   Lab Units 03/03/20  1324   SODIUM mmol/L 140   POTASSIUM mmol/L 4.4   CHLORIDE mmol/L 102   CO2 mmol/L 28.0   BUN mg/dL 16   CREATININE mg/dL 0.89   GLUCOSE mg/dL 90   CALCIUM mg/dL 9.2   ALT (SGPT) U/L 13   AST (SGOT) U/L 18     Estimated Creatinine Clearance: 72 mL/min (by C-G formula based on SCr of 0.89 mg/dL).  Brief Urine Lab Results     None             No radiology results for the last 7 days      Estimated Creatinine Clearance: 72 mL/min (by C-G formula based on SCr of 0.89 mg/dL).    Assessment/Plan      Assessment/Plan       Active Hospital Problems    Diagnosis  POA   • **Hiatal hernia with gastroesophageal reflux [K21.9, K44.9]  Unknown     Priority: High   • Depression [F32.9]  Yes      Resolved Hospital Problems   No resolved problems to display.     Hiatal hernia with GERD status post hiatal hernia repair with transoral incisionless fundoplication by Dr. Ramirez 3/9/2020  -on IV cefazolin x2  -protonix  -full liquid diet  -pain control per surgery    Depression  -cont home prozac      VTE Prophylaxis -   Mechanical Order History:      Ordered        03/09/20 1127  Place Sequential Compression Device  Once         03/09/20 1005  Place Sequential Compression Device  Once         03/09/20 1005  Maintain Sequential Compression Device  Continuous                 Pharmalogical Order History:     None          CODE STATUS:    Code Status and Medical Interventions:   Ordered at: 03/09/20 1127     Code Status:    CPR     Medical Interventions (Level of Support Prior to Arrest):    Full       Admission Status:  I believe this patient meets observation criteria.      I discussed the patient's findings and my recommendations with patient.        Electronically signed by MARY Ruby, 03/09/20, 11:51 AM.  [facility]ist Team

## 2020-03-09 NOTE — ANESTHESIA PROCEDURE NOTES
Airway  Urgency: elective    Date/Time: 3/9/2020 7:42 AM    General Information and Staff    Patient location during procedure: OR    Indications and Patient Condition  Indications for airway management: airway protection    Preoxygenated: yes  Mask difficulty assessment: 2 - vent by mask + OA or adjuvant +/- NMBA    Final Airway Details  Final airway type: endotracheal airway      Successful airway: ETT  Cuffed: yes   Successful intubation technique: direct laryngoscopy  Facilitating devices/methods: intubating stylet  Endotracheal tube insertion site: oral  Blade: Efren  Blade size: 3  ETT size (mm): 7.0  Cormack-Lehane Classification: grade IIa - partial view of glottis  Placement verified by: chest auscultation, capnometry and palpation of cuff   Measured from: teeth  ETT/EBT  to teeth (cm): 21  Number of attempts at approach: 2  Assessment: lips, teeth, and gum same as pre-op and atraumatic intubation

## 2020-03-09 NOTE — ANESTHESIA POSTPROCEDURE EVALUATION
Patient: Christelle Odell    Procedure Summary     Date:  03/09/20 Room / Location:  [facility] OR 08 / [facility] MAIN OR    Anesthesia Start:  0736 Anesthesia Stop:  1011    Procedure:  HIATAL HERNIA REPAIR LAPAROSCOPIC WITH TRANSORAL INCISIONLESS FUNDOPLICATION (N/A Abdomen) Diagnosis:       Hiatal hernia with gastroesophageal reflux      (Hiatal hernia with gastroesophageal reflux [K21.9, K44.9])    Surgeon:  Jose Antonio Ramirez DO Provider:  Zack Monet MD    Anesthesia Type:  general ASA Status:  2          Anesthesia Type: general    Vitals  Vitals Value Taken Time   /78 3/9/2020 11:10 AM   Temp 97.8 °F (36.6 °C) 3/9/2020 11:10 AM   Pulse 76 3/9/2020 11:10 AM   Resp 13 3/9/2020 11:10 AM   SpO2 97 % 3/9/2020 11:10 AM           Post Anesthesia Care and Evaluation    Patient location during evaluation: PACU  Patient participation: complete - patient participated  Level of consciousness: awake  Pain scale: See nurse's notes for pain score.  Pain management: adequate  Airway patency: patent  Anesthetic complications: No anesthetic complications  PONV Status: none  Cardiovascular status: acceptable  Respiratory status: acceptable  Hydration status: acceptable    Comments: Patient seen and examined postoperatively; vital signs stable; SpO2 greater than or equal to 90%; cardiopulmonary status stable; nausea/vomiting adequately controlled; pain adequately controlled; no apparent anesthesia complications; patient discharged from anesthesia care when discharge criteria were met

## 2020-03-09 NOTE — H&P
Subjective   Christelle Odell is a 59 y.o. female.     History of present illness  Christelle is a pleasant 59-year-old seen in the office for severe reflux disease for many years.  She has a 3 cm hiatal hernia with significant reflux.  She is elected to proceed with a laparoscopic hiatal hernia repair and transoral fundoplication.  She presents today for the same.      Gone over several booklets in the office have drawn pictures have answered all her questions.  Past Medical History:   Diagnosis Date   • Allergy     dog dander   • Arthritis     hands   • Depression    • Frequent headaches     sinus   • Frequent sinus infections     occasional   • Gagging episode     gags easily   • GERD (gastroesophageal reflux disease)    • Hypoglycemia    • Hypothyroidism    • PONV (postoperative nausea and vomiting)    • Seasonal allergies    • Vitamin D deficiency        Past Surgical History:   Procedure Laterality Date   • COLONOSCOPY     • ENDOSCOPY N/A 11/5/2019    Procedure: Esophagogastroduodenoscopy with dilatation ( bougie 48, 50, 52, 54, 56, 58);  Surgeon: Jose Antonio Ramirez DO;  Location: [facility] ENDOSCOPY;  Service: General   • HARDWARE REMOVAL Right     ankle   • LAPAROSCOPIC TUBAL LIGATION  1986   • ORIF ANKLE FRACTURE Right 2011   • SINUS SURGERY  2001   • WISDOM TOOTH EXTRACTION         [unfilled]    No Known Allergies    Family History   Problem Relation Age of Onset   • Hypertension Mother    • Depression Mother    • Arthritis Mother    • Allergies Mother    • Hypertension Father    • Allergies Father        Social History     Socioeconomic History   • Marital status:      Spouse name: Not on file   • Number of children: Not on file   • Years of education: Not on file   • Highest education level: Not on file   Tobacco Use   • Smoking status: Never Smoker   • Smokeless tobacco: Never Used   Substance and Sexual Activity   • Alcohol use: Yes     Alcohol/week: 2.0 standard drinks     Types: 2 Glasses of wine per week    • Drug use: No   • Sexual activity: Defer       The following portions of the patient's history were reviewed and updated as appropriate: allergies, current medications, past family history, past medical history, past social history, past surgical history and problem list.    Objective       Assessment/Plan   [unfilled]    All systems are reviewed and unremarkable with exception of the chief complaint.      Physical exam shows pleasant 59-year-old female.  HEENT is negative.  Heart regular.  Lungs clear.  Abdomen soft nontender without mass.  Extremities show equal range of motion in the upper and lower extremities.  She has symmetrical strength and usage.  Neuro shows no obvious focal deficit.    Impression: Hiatal hernia with reflux    Plan: Laparoscopic hiatal hernia repair with transoral fundoplication         Jose Antonio Ramirez,   3/9/2020  7:14 AM

## 2020-03-10 VITALS
SYSTOLIC BLOOD PRESSURE: 146 MMHG | OXYGEN SATURATION: 94 % | HEIGHT: 63 IN | TEMPERATURE: 97.7 F | HEART RATE: 60 BPM | RESPIRATION RATE: 18 BRPM | DIASTOLIC BLOOD PRESSURE: 84 MMHG | BODY MASS INDEX: 33.95 KG/M2 | WEIGHT: 191.58 LBS

## 2020-03-10 PROCEDURE — G0378 HOSPITAL OBSERVATION PER HR: HCPCS

## 2020-03-10 RX ORDER — HYDROCODONE BITARTRATE AND ACETAMINOPHEN 7.5; 325 MG/1; MG/1
1 TABLET ORAL EVERY 6 HOURS PRN
Qty: 30 TABLET | Refills: 0 | Status: SHIPPED | OUTPATIENT
Start: 2020-03-10 | End: 2020-03-23

## 2020-03-10 RX ADMIN — HYDROCODONE BITARTRATE AND ACETAMINOPHEN 1 TABLET: 5; 325 TABLET ORAL at 12:24

## 2020-03-10 RX ADMIN — PANTOPRAZOLE SODIUM 40 MG: 40 TABLET, DELAYED RELEASE ORAL at 05:19

## 2020-03-10 NOTE — PROGRESS NOTES
Discharge Planning Assessment   [facility]     Patient Name: Christelle Odell  MRN: [id number]  Today's Date: 3/10/2020    Admit Date: 3/9/2020    Discharge Needs Assessment     Row Name 03/10/20 1455       Living Environment    Lives With  spouse    Current Living Arrangements  home/apartment/condo    Primary Care Provided by  self    Provides Primary Care For  no one    Family Caregiver if Needed  spouse    Quality of Family Relationships  helpful    Able to Return to Prior Arrangements  yes       Resource/Environmental Concerns    Resource/Environmental Concerns  none       Transition Planning    Patient/Family Anticipates Transition to  home with family    Patient/Family Anticipated Services at Transition  none    Transportation Anticipated  family or friend will provide       Discharge Needs Assessment    Readmission Within the Last 30 Days  no previous admission in last 30 days    Concerns to be Addressed  no discharge needs identified;denies needs/concerns at this time;discharge planning    Equipment Currently Used at Home  none    Anticipated Changes Related to Illness  none    Equipment Needed After Discharge  none    Provided Post Acute Provider List?  N/A    Discharge Coordination/Progress  Spoke to patient at bedside. She denies dc needs at thtis time and is up ad irma in room.         Discharge Plan     Row Name 03/10/20 1456       Plan    Plan  home with family    Plan Comments  See assessment notes                  Demographic Summary     Row Name 03/10/20 1454       General Information    Admission Type  observation meets criteria for inpatient but order has not beed placed by md    Arrived From  emergency department    Referral Source  admission list    Reason for Consult  discharge planning    Preferred Language  English        Functional Status     Row Name 03/10/20 1454       Functional Status    Usual Activity Tolerance  good    Current Activity Tolerance  moderate       Functional Status, IADL     Medications  independent    Meal Preparation  independent    Housekeeping  independent    Laundry  independent    Shopping  independent       Mental Status    General Appearance WDL  WDL       Mental Status Summary    Recent Changes in Mental Status/Cognitive Functioning  no changes              Kelley Umaña RN

## 2020-03-10 NOTE — PLAN OF CARE
"  Problem: Patient Care Overview  Goal: Plan of Care Review  Outcome: Ongoing (interventions implemented as appropriate)  Flowsheets (Taken 3/10/2020 0117)  Progress: improving  Plan of Care Reviewed With: patient  Outcome Summary: pt resting well; complains of slight \"sinus\" headache  "

## 2020-03-11 NOTE — PAYOR COMM NOTE
"[id number]  PA for admission on 3/9/2020  Discharged 3/10/2020 to home      RETURN CONTACT:  SAM DICKSON RN  [facility]  [facility]  PH:[phone]  FAX:[phone]      Vianney Odell (59 y.o. Female)     Date of Birth Social Security Number Address Home Phone MRN    1960  [address] [place], [place] [id number] [phone] [id number]    [employer] Marital Status          [place]        Admission Date Admission Type Admitting Provider Attending Provider Department, Room/Bed    3/9/20 Elective Yesenia Ramirez DO  [facility] SURGICAL INPATIENT, 4127/1    Discharge Date Discharge Disposition Discharge Destination        3/10/2020 Home or Self Care              Attending Provider:  (none)   Allergies:  No Known Allergies    Isolation:  None   Infection:  None   Code Status:  Prior    Ht:  160 cm (63\")   Wt:  86.9 kg (191 lb 9.3 oz)    Admission Cmt:  None   Principal Problem:  Hiatal hernia with gastroesophageal reflux [K21.9,K44.9] More...                 Active Insurance as of 3/9/2020     Primary Coverage     Payor Plan Insurance Group Employer/Plan Group    [facility]      Payor Plan Address Payor Plan Phone Number Payor Plan Fax Number Effective Dates    [address] [phone]  10/23/2019 - None Entered    [facility] [id number]       Subscriber Name Subscriber Birth Date Member ID       VIANNEY ODELL 1960 [id number]                 Emergency Contacts      (Rel.) Home Phone Work Phone Mobile Phone    ITZELYESENIA (Spouse) [phone] -- --               Operative/Procedure Notes (most recent note)      Yesenia Ramirez DO at 03/09/20 0817          HIATAL HERNIA REPAIR LAPAROSCOPIC WITH TRANSORAL INCISIONLESS FUNDOPLICATION  Procedure Report    Patient Name:  Vianney Odell  YOB: 1960    Date of Surgery:  3/9/2020     Indications: Hiatal hernia with reflux    Pre-op Diagnosis:   Hiatal hernia with " gastroesophageal reflux [K21.9, K44.9]       Post-Op Diagnosis Codes:     * Hiatal hernia with gastroesophageal reflux [K21.9, K44.9], 4 to 5 cm hiatal hernia with reflux, type I sliding  Procedure/CPT® Codes:      Procedure(s):  HIATAL HERNIA REPAIR LAPAROSCOPIC WITH TRANSORAL INCISIONLESS FUNDOPLICATION    Staff:  Surgeon(s):  Jose Antonio Ramirez DO    Assistant: Es Brown RNFA    Anesthesia: General    Anesthetist: PAM Hart    Estimated Blood Loss: minimal    Implants:    Implant Name Type Inv. Item Serial No.  Lot No. LRB No. Used   CARTRDG FASTENER GI SEROSAFUSE 7.5MM EA/20 - [id number] Implant CARTRDG FASTENER GI SEROSAFUSE 7.5MM EA/20  ENDOGASTRIC [employer] [id number] N/A 1       Specimen:          None        Findings: 4 to 5 cm hiatal hernia with reflux    Complications: None    Description of Procedure: Christelle is a 59-year-old seen in the office with a long history of severe reflux disease.  We thought she had a 3 cm hernia however at the time of her surgery actually found a 4 to 5 cm hernia.  We discussed the procedure with risks in detail in the office.  All questions were answered.  No new questions today.    Patient was taken operating room placed in supine position.  General was done by PAM Hart and covering anesthesiologist.  Timeout done and identity verified she is placed on the pink anti-slide pad in banana boot lithotomy position with thigh straps securing her to the table and a chest strap.  Abdomen then prepped and draped after 3-minute dry time.  A left paraumbilical incision was made and varies needle was passed through all layers.  Saline drop test was done was negative and the abdomen insufflated with CO2 to approximately 15 mmHg pressure.  Disposable 11 mm trocar and sheath was passed and the laparoscope introduced confirming intra-abdominal positioning with no injury.  The other 4 ports were then placed and the [employer] liver retractor is  positioned  She is placed in steep reverse Trendelenburg position and rolled to the right.  Stomach is then grasped placing it on downward traction  And the the gastro hepatic ligament is opened with the [place] LigaSure device up to near the diaphragm.  The peritoneum overlying the right nadir was then scored and the mediastinal plane is entered and the esophagus is freed from its filmy attachments circumferentially up into the chest gaining length.  We then dissected the left nadir and the crura are then approximated using the Endo Stitch device.  5 stitches are required to close down the defect.  We then used Elmed suture passer and 0 Vicryl stitches were placed through the fascia at both 11 mm port sites.  All ports are then removed allowing CO2 to escape to the atmosphere proving no bleeding from the port sites.  The fascial stitches are tied down then skin closed with 4-0 Vicryl in a subcu manner.  Sterile OpSite dressings applied over Mastisol and Steri-Strips.  She is then taken out of banana boot lithotomy position turned to the left lateral decub position on the blue positioning pillow.  She secured to the table with wide tape and leg straps.  The Olympus upper pan video scope was then passed down the cricopharyngeus into the esophagus stomach and duodenum.  Duodenum was normal scope was withdrawn back into the stomach and retroflexed upon itself the hiatal hernia was now nicely closed down.  We pulled the scope back to the Z line and it was noted at 36 cm from the incisors.  Is very short person with a short esophagus.  We then sequentially dilated her esophagus from 48-58 Fijian using Villavicencio type dilators.  The esophagus to device was then opened and prepared by lubricating it at its pivot points and down the channel scope was then lubricated and passed down its channel then under direct vision the scope and the device were passed down the esophagus and on into the stomach.  Scope was retroflexed upon  itself viewing the tissue mold coming through the diaphragmatic opening.  We then began by loading the instrument and 2 firings were done at 6:00 to between 7 and 8 to between 8 and 9 to between 9 and 10 and 2 between 10 and 11.  We then did 2 firings at 5:00 to between 3 and 4 to between 2 and 3 to between 1 and 2.  The remainder the firings were then done between 5 and 7:00 gaining additional length of the valve.  We had a very nice omega shaped beefy valve.  No further firings were needed.  The scope and the device were then removed under direct vision and we then went back down with the scope to visualize the valve check for any evidence of injury.  There was no injury to the stomach or the esophagus.  The stomach was decompressed of as much fluid and air as possible on the way out.  She tolerated the procedure well was awakened and transferred to recovery in satisfactory condition.  The final sponge instrument and needle counts were correct.  Jose Antonio Ramirez DO     Date: 3/9/2020  Time: 9:56 AM      Electronically signed by Jose Antonio Ramirez DO at 03/09/20 1002

## 2020-03-11 NOTE — DISCHARGE SUMMARY
Date of Discharge:  3/11/2020    Admitting Diagnosis: Hiatal hernia with reflux    Discharge Diagnosis: Same    Presenting Problem/History of Present Illness  Active Hospital Problems    Diagnosis  POA   • **Hiatal hernia with gastroesophageal reflux [K21.9, K44.9]  Yes   • Depression [F32.9]  Yes      Resolved Hospital Problems   No resolved problems to display.          Hospital Course  Patient is a 59 y.o. female presented with hiatal hernia with reflux.  She underwent a laparoscopic hiatal hernia repair with transoral fundoplication.  Her first postop day she looked good had no nausea was tolerating full liquid diet and ready for discharge to home.  She is to follow with us in the office in 2 weeks.  She is to stay on her proton pump inhibitor..      Procedures Performed    Procedure(s):  HIATAL HERNIA REPAIR LAPAROSCOPIC WITH TRANSORAL INCISIONLESS FUNDOPLICATION  -------------------       Consults:   Consults     No orders found from 2/9/2020 to 3/10/2020.          Pertinent Test Results:     Condition on Discharge: Stable    Vital Signs  Temp:  [97.7 °F (36.5 °C)] 97.7 °F (36.5 °C)  Heart Rate:  [60] 60  Resp:  [18] 18  BP: (146)/(84) 146/84    Physical Exam:   Physical Exam    Discharge Disposition  Home or Self Care    Discharge Medications     Discharge Medications      New Medications      Instructions Start Date   HYDROcodone-acetaminophen 7.5-325 MG per tablet  Commonly known as:  NORCO   1 tablet, Oral, Every 6 Hours PRN         Continue These Medications      Instructions Start Date   ALLEGRA ALLERGY PO   180 mg, Oral, Daily, afternoon      Cholecalciferol 50 MCG (2000 UT) tablet   2,000 Units, Oral, 3 Times Weekly      docusate sodium 100 MG capsule  Commonly known as:  COLACE   100 mg, Oral, Daily, afternoon       esomeprazole 20 MG capsule  Commonly known as:  nexIUM   20 mg, Oral, Daily, afternoon      FLUoxetine 20 MG capsule  Commonly known as:  PROzac   20 mg, Oral, Nightly      MULTIVITAL  PO   1 tablet, Oral, Daily, dont take preop      TIROSINT 50 MCG capsule  Generic drug:  levothyroxine sodium   50 mcg, Oral, Daily, Take preop      VITAMIN D BOOSTER PO   1 capsule, Oral, Daily, dont take preop             Discharge Diet:   Diet Instructions     Diet: Full Liquid; Pudding Thick Liquids      Discharge Diet:  Full Liquid    Fluid Consistency:  Pudding Thick Liquids          Activity at Discharge:   Activity Instructions     Driving Restrictions      Type of Restriction:  Driving    Driving Restrictions:  No Driving While Taking Narcotics    Gradually Increase Activity Until at Pre-Hospitalization Level      Lifting Restrictions      Type of Restriction:  Lifting    Lifting Restrictions:  Lifting Restriction (Indicate Limit)    Weight Limit (Pounds):  15    Length of Lifting Restriction:  4 weeks          Follow-up Appointments  Future Appointments   Date Time Provider Department Center   3/23/2020  1:40 PM Jose Antonio Ramirez DO [facility] None     Additional Instructions for the Follow-ups that You Need to Schedule     Discharge Follow-up with Specified Provider: Dr. Ramirez; 2 Weeks   As directed      To:  Dr. Ramirez    Follow Up:  2 Weeks               Test Results Pending at Discharge       Jose Antonio Ramirez DO  03/11/20  10:08

## 2020-03-23 ENCOUNTER — OFFICE VISIT (OUTPATIENT)
Dept: SURGERY | Facility: CLINIC | Age: 60
End: 2020-03-23

## 2020-03-23 VITALS
WEIGHT: 187.4 LBS | SYSTOLIC BLOOD PRESSURE: 123 MMHG | BODY MASS INDEX: 33.2 KG/M2 | TEMPERATURE: 98.9 F | HEIGHT: 63 IN | OXYGEN SATURATION: 99 % | DIASTOLIC BLOOD PRESSURE: 82 MMHG | HEART RATE: 76 BPM

## 2020-03-23 DIAGNOSIS — K21.9 HIATAL HERNIA WITH GASTROESOPHAGEAL REFLUX: Primary | ICD-10-CM

## 2020-03-23 DIAGNOSIS — K44.9 HIATAL HERNIA WITH GASTROESOPHAGEAL REFLUX: Primary | ICD-10-CM

## 2020-03-23 PROCEDURE — 99024 POSTOP FOLLOW-UP VISIT: CPT | Performed by: SURGERY

## 2020-03-23 NOTE — PROGRESS NOTES
SUBJECTIVE:    Christelle seen in the office today follow-up from her lap hiatal hernia repair with transoral fundoplication 2 weeks ago.  She is doing very well.  No fevers or chills.  No nausea or vomiting.  Tolerating the full liquid diet well.    OBJECTIVE:    Incisions are healing appropriately without infection    ASSESSMENT:    Satisfactory postop progress    PLAN:    Recheck in the office in 2 weeks, she may now advance to soft diet, she may also decrease her Nexium to every other day

## 2020-04-08 ENCOUNTER — OFFICE VISIT (OUTPATIENT)
Dept: SURGERY | Facility: CLINIC | Age: 60
End: 2020-04-08

## 2020-04-08 VITALS
BODY MASS INDEX: 32.5 KG/M2 | WEIGHT: 183.4 LBS | HEIGHT: 63 IN | OXYGEN SATURATION: 97 % | HEART RATE: 72 BPM | SYSTOLIC BLOOD PRESSURE: 141 MMHG | DIASTOLIC BLOOD PRESSURE: 94 MMHG | TEMPERATURE: 98.6 F

## 2020-04-08 DIAGNOSIS — K44.9 HIATAL HERNIA WITH GASTROESOPHAGEAL REFLUX: Primary | ICD-10-CM

## 2020-04-08 DIAGNOSIS — K21.9 HIATAL HERNIA WITH GASTROESOPHAGEAL REFLUX: Primary | ICD-10-CM

## 2020-04-08 PROCEDURE — 99024 POSTOP FOLLOW-UP VISIT: CPT | Performed by: SURGERY

## 2020-04-08 NOTE — PROGRESS NOTES
SUBJECTIVE:    Christelle is seen in the office today follow-up from her lap hiatal hernia repair with transoral fundoplication 4 weeks ago.  She is doing well.  No fevers or chills.  No nausea or vomiting.  Tolerated soft diet well.  Well with Nexium every other day.    OBJECTIVE:    Incisions are healing appropriately without infection.    ASSESSMENT:    Satisfactory postop progress    PLAN:    Recheck in the office in 2 weeks.  She may stop her Nexium.  Is to stay on the same diet another 2 weeks

## 2020-04-15 ENCOUNTER — HOSPITAL ENCOUNTER (OUTPATIENT)
Dept: OTHER | Facility: HOSPITAL | Age: 60
Discharge: HOME | End: 2020-04-15
Attending: PHYSICIAN ASSISTANT

## 2020-04-22 ENCOUNTER — OFFICE VISIT (OUTPATIENT)
Dept: SURGERY | Facility: CLINIC | Age: 60
End: 2020-04-22

## 2020-04-22 VITALS
DIASTOLIC BLOOD PRESSURE: 86 MMHG | BODY MASS INDEX: 32.96 KG/M2 | SYSTOLIC BLOOD PRESSURE: 124 MMHG | TEMPERATURE: 98.3 F | HEIGHT: 63 IN | HEART RATE: 83 BPM | OXYGEN SATURATION: 96 % | WEIGHT: 186 LBS

## 2020-04-22 DIAGNOSIS — K44.9 HIATAL HERNIA WITH GASTROESOPHAGEAL REFLUX: Primary | ICD-10-CM

## 2020-04-22 DIAGNOSIS — K21.9 HIATAL HERNIA WITH GASTROESOPHAGEAL REFLUX: Primary | ICD-10-CM

## 2020-04-22 PROCEDURE — 99024 POSTOP FOLLOW-UP VISIT: CPT | Performed by: SURGERY

## 2020-05-07 ENCOUNTER — OFFICE VISIT CONVERTED (OUTPATIENT)
Dept: ORTHOPEDIC SURGERY | Facility: CLINIC | Age: 60
End: 2020-05-07
Attending: ORTHOPAEDIC SURGERY

## 2020-05-28 ENCOUNTER — OFFICE VISIT CONVERTED (OUTPATIENT)
Dept: ORTHOPEDIC SURGERY | Facility: CLINIC | Age: 60
End: 2020-05-28
Attending: PHYSICIAN ASSISTANT

## 2020-06-25 ENCOUNTER — CONVERSION ENCOUNTER (OUTPATIENT)
Dept: ORTHOPEDIC SURGERY | Facility: CLINIC | Age: 60
End: 2020-06-25

## 2020-06-25 ENCOUNTER — OFFICE VISIT CONVERTED (OUTPATIENT)
Dept: ORTHOPEDIC SURGERY | Facility: CLINIC | Age: 60
End: 2020-06-25
Attending: PHYSICIAN ASSISTANT

## 2021-05-10 NOTE — H&P
History and Physical      Patient Name: Christelle Theodore   Patient ID: [id number]   Sex: Female   YOB: 1960    Primary Care Provider: Halima Ramírez DO    Visit Date: May 7, 2020    Provider: Preethi Hylton MD   Location: [facility]   Location Address: [address] [place] [address]  [id number]   Location Phone: [phone]          Chief Complaint  · Left Shoulder Pain      History Of Present Illness  Christelle Theodore is a 59 year old /White female who presents today to [facility].      She's here for evaluation of her left shoulder. She's a 59 year old female who starting have deltoid pain that's been going on for 4-5 months. She's done Voltaren, Ibuprofen, Tylenol, and resting it. I think she's now developed a frozen shoulder. She has an MRI that shows some mild AC joint arthritis. Her pain is made worse with activity. She lacks range of motion.       Past Medical History  Allergic rhinitis, chronic; Anemia, Unspecified; Arthritis; Gastritis; GERD; High blood pressure; High cholesterol; Hypothyroidism; Kidney stones; Mood disorder; Seasonal allergies; Shortness Of Air; Thyroid disorder         Past Surgical History  Ankle surgery; Colonoscopy; Hernia; Tonsillectomy; Tubal_Ligation         Medication List  diclofenac sodium 1 % topical gel; fluoxetine 20 mg oral capsule; hydrocodone-acetaminophen 7.5-325 mg oral tablet; nabumetone 750 mg oral tablet; Stool Softener oral; Synthroid oral; Synthroid 50 mcg oral tablet; Tirosint 50 mcg oral capsule         Allergy List  NO KNOWN DRUG ALLERGIES; Anesthetic         Family Medical History  Heart Disease; Diabetes, unspecified type; Renal Calculus; Family history of Arthritis         Social History  Alcohol (Current some day); Alcohol Use (Current some day); Caffeine (Current every day); Claustophobic (Unknown); Homemaker.; lives 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; .; Recreational Drug Use (Never); Second hand smoke exposure (Never); Tobacco  "(Never)         Review of Systems  · Constitutional  o Denies  o : fever, chills, weight loss  · Cardiovascular  o Denies  o : chest pain, shortness of breath  · Gastrointestinal  o Denies  o : liver disease, heartburn, nausea, blood in stools  · Genitourinary  o Denies  o : painful urination, blood in urine  · Integument  o Denies  o : rash, itching  · Neurologic  o Denies  o : headache, weakness, loss of consciousness  · Musculoskeletal  o Denies  o : painful, swollen joints  · Psychiatric  o Admits  o : anxiety, depression  o Denies  o : drug/alcohol addiction      Vitals  Date Time BP Position Site L\R Cuff Size HR RR TEMP (F) WT  HT  BMI kg/m2 BSA m2 O2 Sat HC       05/07/2020 10:38 AM         190lbs 0oz 5'  4\" 32.61 1.97           Physical Examination  · Constitutional  o Appearance  o : well developed, well-nourished, no obvious deformities present  · Head and Face  o Head  o :   § Inspection  § : normocephalic  o Face  o :   § Inspection  § : no facial lesions  · Eyes  o Conjunctivae  o : conjunctivae normal  o Sclerae  o : sclerae white  · Ears, Nose, Mouth and Throat  o Ears  o :   § External Ears  § : appearance within normal limits  § Hearing  § : intact  o Nose  o :   § External Nose  § : appearance normal  · Neck  o Inspection/Palpation  o : normal appearance  o Range of Motion  o : full range of motion  · Respiratory  o Respiratory Effort  o : breathing unlabored  o Inspection of Chest  o : normal appearance  o Auscultation of Lungs  o : no audible wheezing or rales  · Cardiovascular  o Heart  o : regular rate  · Gastrointestinal  o Abdominal Examination  o : soft and non-tender  · Skin and Subcutaneous Tissue  o General Inspection  o : intact, no rashes  · Psychiatric  o General  o : Alert and oriented x3  o Judgement and Insight  o : judgment and insight intact  o Mood and Affect  o : mood normal, affect appropriate  · Right Shoulder  o Inspection  o : ER 55. Sensation intact. Pulses normal. "   · Left Shoulder  o Inspection  o : ER 10. Forward elevation 45. Sensation intact. Pulses normal.   · Injection Note/Aspiration Note  o Site  o : left shoulder   o Procedure  o : Procedure: After educating the patient, patient gave consent for procedure. After using Chloraprep, the joint space was injected. The patient tolerated the procedure well.   o Medication  o : 80 mg of DepoMedrol with 9cc of 1% Lidocaine  · Imaging  o Imaging  o : Left shoulder MRI 4/15/2020 revealed 1) Mild AC osteoarthritis. 2) Chronic degenerative changes/degenerative tearing of the anteroinferior labrum.           Assessment  · Left shoulder pain, unspecified chronicity     719.41/M25.512  · Adhesive capsulitis of left shoulder     726.0/M75.02      Plan  · Orders  o Depo-Medrol injection 80mg () - - 05/07/2020   Lot [id number] Exp 05 2021 [employer] Administered by BRII GARCIA  o Shoulder Intra-articular Injection without US Guidance [facility] ([id number]) - - 05/07/2020   Lot [id number] Exp 07 01 2021 [employer] Administered by BRII GARCIA  · Medications  o Medications have been Reconciled  o Transition of Care or Provider Policy  · Instructions  o Reviewed the patient's Past Medical, Social, and Family history as well as the ROS at today's visit, no changes.  o Call or return if worsening symptoms.  o Exercise handout given.  o Reviewed MRI.  o This note was transcribed by Haylee Elizabeth.   o We are going to try some therapy and a shot. We will go from there.  o Electronically Identified Patient Education Materials Provided Electronically            Electronically Signed by: Haylee Elizabeth - , Other -Author on May 7, 2020 04:03:12 PM  Electronically Co-signed by: Preethi Hylton MD -Reviewer on May 8, 2020 09:45:55 AM

## 2021-05-13 NOTE — PROGRESS NOTES
Progress Note      Patient Name: Christelle Theodore   Patient ID: [id number]   Sex: Female   YOB: 1960    Primary Care Provider: Halima Ramírez DO   Referring Provider: Preethi Hylton MD    Visit Date: June 25, 2020    Provider: Zoraida Booth PA-C   Location: [facility]   Location Address: [address] [place] [address]  [id number]   Location Phone: [phone]          Chief Complaint  · Follow up left shoulder pain      History Of Present Illness  Christelle Theodore is a 59 year old /White female who presents today to [facility].      She is following up for left shoulder adhesive capsulitis. She has made progress in PT.       Past Medical History  Allergic rhinitis, chronic; Anemia, Unspecified; Arthritis; Gastritis; GERD; High blood pressure; High cholesterol; Hypothyroidism; Kidney stones; Mood disorder; Seasonal allergies; Shortness Of Air; Thyroid disorder         Past Surgical History  Ankle surgery; Colonoscopy; Hernia; Tonsillectomy; Tubal_Ligation         Medication List  diclofenac sodium 1 % topical gel; fluoxetine 20 mg oral capsule; hydrocodone-acetaminophen 7.5-325 mg oral tablet; nabumetone 750 mg oral tablet; Stool Softener oral; Synthroid oral; Synthroid 50 mcg oral tablet; Tirosint 50 mcg oral capsule         Allergy List  NO KNOWN DRUG ALLERGIES; Anesthetic       Allergies Reconciled  Family Medical History  Heart Disease; Diabetes, unspecified type; Renal Calculus; Family history of Arthritis         Social History  Alcohol (Current some day); Alcohol Use (Current some day); Caffeine (Current every day); Claustophobic (Unknown); Homemaker.; lives with spouse; .; Recreational Drug Use (Never); Second hand smoke exposure (Never); Tobacco (Never)         Review of Systems  · Constitutional  o Denies  o : fever, chills, weight loss  · Cardiovascular  o Denies  o : chest pain, shortness of breath  · Gastrointestinal  o Denies  o : liver disease,  "heartburn, nausea, blood in stools  · Genitourinary  o Denies  o : painful urination, blood in urine  · Integument  o Denies  o : rash, itching  · Neurologic  o Denies  o : headache, weakness, loss of consciousness  · Musculoskeletal  o Admits  o : painful, swollen joints  · Psychiatric  o Denies  o : drug/alcohol addiction, anxiety, depression      Vitals  Date Time BP Position Site L\R Cuff Size HR RR TEMP (F) WT  HT  BMI kg/m2 BSA m2 O2 Sat        06/25/2020 10:02 AM      65 - R   184lbs 16oz 5'  3.5\" 32.26 1.94 97 %          Physical Examination  · Constitutional  o Appearance  o : well developed, well-nourished, no obvious deformities present  · Head and Face  o Head  o :   § Inspection  § : normocephalic  o Face  o :   § Inspection  § : no facial lesions  · Eyes  o Conjunctivae  o : conjunctivae normal  o Sclerae  o : sclerae white  · Ears, Nose, Mouth and Throat  o Ears  o :   § External Ears  § : appearance within normal limits  § Hearing  § : intact  o Nose  o :   § External Nose  § : appearance normal  · Neck  o Inspection/Palpation  o : normal appearance  o Range of Motion  o : full range of motion  · Respiratory  o Respiratory Effort  o : breathing unlabored  o Inspection of Chest  o : normal appearance  o Auscultation of Lungs  o : no audible wheezing or rales  · Cardiovascular  o Heart  o : regular rate  · Gastrointestinal  o Abdominal Examination  o : soft and non-tender  · Skin and Subcutaneous Tissue  o General Inspection  o : intact, no rashes  · Psychiatric  o General  o : Alert and oriented x3  o Judgement and Insight  o : judgment and insight intact  o Mood and Affect  o : mood normal, affect appropriate  · Left Shoulder  o Inspection  o : Normal appearing. Nontender. Forward flexion 160 degrees. Abduction 120 degrees. ER 45 degrees. IR to L1. Strength 4/5. Neurovascularly intact.           Assessment  · Pain: Shoulder     719.41/M25.519  · Adhesive capsulitis of " shoulder     726.0/M75.00      Plan  · Instructions  o Reviewed the patient's Past Medical, Social, and Family history as well as the ROS at today's visit, no changes.  o Call or return if worsening symptoms.  o She will continue PT/conservative treatment. Follow Up PRN.            Electronically Signed by: BRENNEN Sanders-C -Author on June 25, 2020 10:43:43 AM  Electronically Co-signed by: Preethi Hylton MD -Reviewer on June 25, 2020 12:05:58 PM

## 2021-05-13 NOTE — PROGRESS NOTES
Progress Note      Patient Name: Christelle Theodore   Patient ID: [id number]   Sex: Female   YOB: 1960    Primary Care Provider: Halima Ramírez DO    Visit Date: May 28, 2020    Provider: Zoraida Booth PA-C   Location: [facility]   Location Address: [address] [place] [address]  [id number]   Location Phone: [phone]          Chief Complaint  · Follow up left shoulder pain      History Of Present Illness  Christelle Theodore is a 59 year old /White female who presents today to [facility].      She is following up for left frozen shoulder. She had injection and is working with PT. She has made some progress, but has a ways to go.       Past Medical History  Allergic rhinitis, chronic; Anemia, Unspecified; Arthritis; Gastritis; GERD; High blood pressure; High cholesterol; Hypothyroidism; Kidney stones; Mood disorder; Seasonal allergies; Shortness Of Air; Thyroid disorder         Past Surgical History  Ankle surgery; Colonoscopy; Hernia; Tonsillectomy; Tubal_Ligation         Medication List  diclofenac sodium 1 % topical gel; fluoxetine 20 mg oral capsule; hydrocodone-acetaminophen 7.5-325 mg oral tablet; nabumetone 750 mg oral tablet; Stool Softener oral; Synthroid oral; Synthroid 50 mcg oral tablet; Tirosint 50 mcg oral capsule         Allergy List  NO KNOWN DRUG ALLERGIES; Anesthetic       Allergies Reconciled  Family Medical History  Heart Disease; Diabetes, unspecified type; Renal Calculus; Family history of Arthritis         Social History  Alcohol (Current some day); Alcohol Use (Current some day); Caffeine (Current every day); Claustophobic (Unknown); Homemaker.; lives 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; .; Recreational Drug Use (Never); Second hand smoke exposure (Never); Tobacco (Never)         Review of Systems  · Constitutional  o Denies  o : fever, chills, weight loss  · Cardiovascular  o Denies  o : chest pain, shortness of breath  · Gastrointestinal  o Denies  o : liver  "disease, heartburn, nausea, blood in stools  · Genitourinary  o Denies  o : painful urination, blood in urine  · Integument  o Denies  o : rash, itching  · Neurologic  o Denies  o : headache, weakness, loss of consciousness  · Musculoskeletal  o Admits  o : painful, swollen joints  · Psychiatric  o Denies  o : drug/alcohol addiction, anxiety, depression      Vitals  Date Time BP Position Site L\R Cuff Size HR RR TEMP (F) WT  HT  BMI kg/m2 BSA m2 O2 Sat        05/28/2020 02:19 PM      74 - R   184lbs 16oz 5'  3.5\" 32.26 1.94 98 %          Physical Examination  · Constitutional  o Appearance  o : well developed, well-nourished, no obvious deformities present  · Head and Face  o Head  o :   § Inspection  § : normocephalic  o Face  o :   § Inspection  § : no facial lesions  · Eyes  o Conjunctivae  o : conjunctivae normal  o Sclerae  o : sclerae white  · Ears, Nose, Mouth and Throat  o Ears  o :   § External Ears  § : appearance within normal limits  § Hearing  § : intact  o Nose  o :   § External Nose  § : appearance normal  · Neck  o Inspection/Palpation  o : normal appearance  o Range of Motion  o : full range of motion  · Respiratory  o Respiratory Effort  o : breathing unlabored  o Inspection of Chest  o : normal appearance  o Auscultation of Lungs  o : no audible wheezing or rales  · Cardiovascular  o Heart  o : regular rate  · Gastrointestinal  o Abdominal Examination  o : soft and non-tender  · Skin and Subcutaneous Tissue  o General Inspection  o : intact, no rashes  · Psychiatric  o General  o : Alert and oriented x3  o Judgement and Insight  o : judgment and insight intact  o Mood and Affect  o : mood normal, affect appropriate  · Left Shoulder  o Inspection  o : Normal appearing. Nontender. Forward flexion 90 degrees. Abduction 80 degrees. ER 30 degrees. IR to PSIS.           Assessment  · Pain: Shoulder     719.41/M25.519  · Adhesive capsulitis     726.0/M75.00      Plan  · Medications  o Medications have " been Reconciled  o Transition of Care or Provider Policy  · Instructions  o Reviewed the patient's Past Medical, Social, and Family history as well as the ROS at today's visit, no changes.  o Call or return if worsening symptoms.  o She has to go out of town to help take care of her mother, so she will continue HEP/PT. Follow up 4 weeks, consider manipulation.   o Electronically Identified Patient Education Materials Provided Electronically  · Referrals  o ID: [id number] Date: 05/07/2020 Type: Inbound  Specialty: Orthopedic Surgery            Electronically Signed by: Zoraida Booth PA-C -Author on May 28, 2020 03:13:12 PM

## 2021-05-15 VITALS — BODY MASS INDEX: 32.44 KG/M2 | WEIGHT: 190 LBS | HEIGHT: 64 IN

## 2021-05-15 VITALS — HEART RATE: 65 BPM | OXYGEN SATURATION: 97 % | BODY MASS INDEX: 32.78 KG/M2 | WEIGHT: 185 LBS | HEIGHT: 63 IN

## 2021-05-15 VITALS — BODY MASS INDEX: 32.78 KG/M2 | OXYGEN SATURATION: 98 % | HEART RATE: 74 BPM | HEIGHT: 63 IN | WEIGHT: 185 LBS

## (undated) DEVICE — ENDOPATH PNEUMONEEDLE INSUFFLATION NEEDLES WITH LUER LOCK CONNECTORS 120MM: Brand: ENDOPATH

## (undated) DEVICE — TOTAL TRAY, DB, 100% SILI FOLEY, 16FR 10: Brand: MEDLINE

## (undated) DEVICE — ENDOPATH 5MM CURVED SCISSORS WITH MONOPOLAR CAUTERY: Brand: ENDOPATH

## (undated) DEVICE — POLYESTER SINGLE STITCH RELOAD: Brand: SURGIDAC

## (undated) DEVICE — SYR LL TP 10ML STRL

## (undated) DEVICE — GENERAL LAPAROSCOPY CDS: Brand: MEDLINE INDUSTRIES, INC.

## (undated) DEVICE — DEV LAP LIGASURE BLNT SEALER/DIV MARYLAND 37CM

## (undated) DEVICE — SOL IRRIG H2O 1000ML STRL

## (undated) DEVICE — SOL NACL 0.9PCT 1000ML

## (undated) DEVICE — ESOPHYX Z+ FASTENER DELIVERY DEVICE WITH SEROSAFUSE FASTENERS AND ACCESSORIES: Brand: ESOPHYX Z+

## (undated) DEVICE — CUFF SCD HEMOFORCE SEQ CALF STD MD

## (undated) DEVICE — BITEBLOCK ENDO W/STRAP 60F A/ LF DISP

## (undated) DEVICE — KT ANTI FOG W/FLD AND SPNG

## (undated) DEVICE — UNDYED BRAIDED (POLYGLACTIN 910), SYNTHETIC ABSORBABLE SUTURE: Brand: COATED VICRYL

## (undated) DEVICE — ENDOPATH XCEL DILATING TIP TROCARS WITH STABILITY SLEEVES: Brand: ENDOPATH XCEL

## (undated) DEVICE — GLV SURG BIOGEL SENSR LTX PF SZ7.5

## (undated) DEVICE — 3M™ STERI-STRIP™ REINFORCED ADHESIVE SKIN CLOSURES, R1547, 1/2 IN X 4 IN (12 MM X 100 MM), 6 STRIPS/ENVELOPE: Brand: 3M™ STERI-STRIP™

## (undated) DEVICE — PETROLATUM GAUZE CISION DRESSING: Brand: VASELINE

## (undated) DEVICE — PK PROC TURNOVER

## (undated) DEVICE — LAPAROSCOPIC GAS CONDITIONING DEVICE.: Brand: INSUFLOW

## (undated) DEVICE — DEV COND GAS LAP INSUFLOW W/LUER CONN

## (undated) DEVICE — PK ENDO GI 50

## (undated) DEVICE — SUT VIC 0 SUTUPAK TIES 18IN J906G

## (undated) DEVICE — ENDOPATH XCEL WITH OPTIVIEW TECHNOLOGY UNIVERSAL TROCAR STABILITY SLEEVES: Brand: ENDOPATH XCEL OPTIVIEW

## (undated) DEVICE — SUTURING DEVICE: Brand: ENDO STITCH

## (undated) DEVICE — DRAPE, LAVH, STERILE: Brand: MEDLINE

## (undated) DEVICE — SPNG GZ AVANT 6PLY 4X4IN STRL PK/2

## (undated) DEVICE — DRSNG SURESITE WNDW 2.38X2.75

## (undated) DEVICE — 40580 - THE PINK PAD - ADVANCED TRENDELENBURG POSITIONING KIT: Brand: 40580 - THE PINK PAD - ADVANCED TRENDELENBURG POSITIONING KIT